# Patient Record
Sex: MALE | Race: OTHER
[De-identification: names, ages, dates, MRNs, and addresses within clinical notes are randomized per-mention and may not be internally consistent; named-entity substitution may affect disease eponyms.]

---

## 2019-02-15 ENCOUNTER — HOSPITAL ENCOUNTER (EMERGENCY)
Dept: HOSPITAL 61 - ER | Age: 34
LOS: 1 days | Discharge: HOME | End: 2019-02-16
Payer: COMMERCIAL

## 2019-02-15 VITALS — WEIGHT: 238 LBS | HEIGHT: 72 IN | BODY MASS INDEX: 32.23 KG/M2

## 2019-02-15 DIAGNOSIS — R42: ICD-10-CM

## 2019-02-15 DIAGNOSIS — R07.89: ICD-10-CM

## 2019-02-15 DIAGNOSIS — R03.0: Primary | ICD-10-CM

## 2019-02-15 LAB
BASOPHILS # BLD AUTO: 0 X10^3/UL (ref 0–0.2)
BASOPHILS NFR BLD: 1 % (ref 0–3)
EOSINOPHIL NFR BLD: 0.1 X10^3/UL (ref 0–0.7)
EOSINOPHIL NFR BLD: 1 % (ref 0–3)
ERYTHROCYTE [DISTWIDTH] IN BLOOD BY AUTOMATED COUNT: 12.4 % (ref 11.5–14.5)
HCT VFR BLD CALC: 43.6 % (ref 39–53)
HGB BLD-MCNC: 14.8 G/DL (ref 13–17.5)
LYMPHOCYTES # BLD: 3.7 X10^3/UL (ref 1–4.8)
LYMPHOCYTES NFR BLD AUTO: 45 % (ref 24–48)
MCH RBC QN AUTO: 31 PG (ref 25–35)
MCHC RBC AUTO-ENTMCNC: 34 G/DL (ref 31–37)
MCV RBC AUTO: 93 FL (ref 79–100)
MONO #: 0.6 X10^3/UL (ref 0–1.1)
MONOCYTES NFR BLD: 8 % (ref 0–9)
NEUT #: 3.8 X10^3UL (ref 1.8–7.7)
NEUTROPHILS NFR BLD AUTO: 46 % (ref 31–73)
PLATELET # BLD AUTO: 205 X10^3/UL (ref 140–400)
RBC # BLD AUTO: 4.71 X10^6/UL (ref 4.3–5.7)
WBC # BLD AUTO: 8.2 X10^3/UL (ref 4–11)

## 2019-02-15 PROCEDURE — 71045 X-RAY EXAM CHEST 1 VIEW: CPT

## 2019-02-15 PROCEDURE — 93005 ELECTROCARDIOGRAM TRACING: CPT

## 2019-02-15 PROCEDURE — 85025 COMPLETE CBC W/AUTO DIFF WBC: CPT

## 2019-02-15 PROCEDURE — 84484 ASSAY OF TROPONIN QUANT: CPT

## 2019-02-15 PROCEDURE — 80053 COMPREHEN METABOLIC PANEL: CPT

## 2019-02-15 PROCEDURE — 36415 COLL VENOUS BLD VENIPUNCTURE: CPT

## 2019-02-15 PROCEDURE — 83735 ASSAY OF MAGNESIUM: CPT

## 2019-02-15 PROCEDURE — 99284 EMERGENCY DEPT VISIT MOD MDM: CPT

## 2019-02-16 VITALS — DIASTOLIC BLOOD PRESSURE: 71 MMHG | SYSTOLIC BLOOD PRESSURE: 130 MMHG

## 2019-02-16 LAB
ALBUMIN SERPL-MCNC: 4 G/DL (ref 3.4–5)
ALBUMIN/GLOB SERPL: 1.1 {RATIO} (ref 1–1.7)
ALP SERPL-CCNC: 64 U/L (ref 46–116)
ALT SERPL-CCNC: 57 U/L (ref 16–63)
ANION GAP SERPL CALC-SCNC: 11 MMOL/L (ref 6–14)
AST SERPL-CCNC: 25 U/L (ref 15–37)
BILIRUB SERPL-MCNC: 0.4 MG/DL (ref 0.2–1)
BUN SERPL-MCNC: 19 MG/DL (ref 8–26)
BUN/CREAT SERPL: 21 (ref 6–20)
CALCIUM SERPL-MCNC: 9.4 MG/DL (ref 8.5–10.1)
CHLORIDE SERPL-SCNC: 103 MMOL/L (ref 98–107)
CO2 SERPL-SCNC: 26 MMOL/L (ref 21–32)
CREAT SERPL-MCNC: 0.9 MG/DL (ref 0.7–1.3)
GFR SERPLBLD BASED ON 1.73 SQ M-ARVRAT: 97.8 ML/MIN
GLOBULIN SER-MCNC: 3.8 G/DL (ref 2.2–3.8)
GLUCOSE SERPL-MCNC: 122 MG/DL (ref 70–99)
POTASSIUM SERPL-SCNC: 3.5 MMOL/L (ref 3.5–5.1)
PROT SERPL-MCNC: 7.8 G/DL (ref 6.4–8.2)
SODIUM SERPL-SCNC: 140 MMOL/L (ref 136–145)

## 2019-02-16 NOTE — PHYS DOC
Adult General


Chief Complaint


Chief Complaint:  DIZZY/LIGHT HEADED





HPI


HPI





Patient is a 33  year old male who presents with chest pain and lightheadedness


He has been on a diet for the last week he has cut out so that he is really not 

eating as much as he usually is used to he was sitting at the kitchen table 

tonight with his family he was looking at his cell phone he looked up quickly 

and he got a sort of a head rush she felt very lightheaded he felt some chest 

pressure that just lasted a couple of minutes it went away on its own he is 

currently feeling better but he wanted to get checked out no family history of 

coronary disease borderline hypertension but is trying to control his diet to 

help as noted above.





Review of Systems


Review of Systems


Negative for fever negative for shortness of breath otherwise negative except 

as noted above.





Allergies


Allergies





Allergies








Coded Allergies Type Severity Reaction Last Updated Verified


 


  No Known Drug Allergies    2/16/19 No











Physical Exam


Physical Exam





Constitutional: Well developed, well nourished, no acute distress, non-toxic 

appearance. []


HENT: Normocephalic, atraumatic, bilateral external ears normal, oropharynx 

moist, no oral exudates, nose normal. []


Eyes: PERRLA, EOMI, conjunctiva normal, no discharge. [] 


Neck: Normal range of motion, no tenderness, supple, no stridor. [] 


Cardiovascular:Heart rate regular rhythm, no murmur []


Lungs & Thorax:  Bilateral breath sounds clear to auscultation []


Abdomen: Bowel sounds normal, soft, no tenderness, no masses, no pulsatile 

masses. [] 


Extremities: No tenderness, no cyanosis, no clubbing, ROM intact, no edema. [] 


Neurologic: Alert and oriented X 3, normal motor function, normal sensory 

function, no focal deficits noted. []





Current Patient Data


Vital Signs





 Vital Signs








  Date Time  Temp Pulse Resp B/P (MAP) Pulse Ox O2 Delivery O2 Flow Rate FiO2


 


2/16/19 02:22  64 14  96   


 


2/15/19 23:50 97.8   133/87 (102)  Room Air  





 97.8       








Lab Values





 Laboratory Tests








Test


 2/15/19


23:33 2/16/19


01:38


 


White Blood Count


 8.2 x10^3/uL


(4.0-11.0) 





 


Red Blood Count


 4.71 x10^6/uL


(4.30-5.70) 





 


Hemoglobin


 14.8 g/dL


(13.0-17.5) 





 


Hematocrit


 43.6 %


(39.0-53.0) 





 


Mean Corpuscular Volume


 93 fL ()


 





 


Mean Corpuscular Hemoglobin 31 pg (25-35)   


 


Mean Corpuscular Hemoglobin


Concent 34 g/dL


(31-37) 





 


Red Cell Distribution Width


 12.4 %


(11.5-14.5) 





 


Platelet Count


 205 x10^3/uL


(140-400) 





 


Neutrophils (%) (Auto) 46 % (31-73)   


 


Lymphocytes (%) (Auto) 45 % (24-48)   


 


Monocytes (%) (Auto) 8 % (0-9)   


 


Eosinophils (%) (Auto) 1 % (0-3)   


 


Basophils (%) (Auto) 1 % (0-3)   


 


Neutrophils # (Auto)


 3.8 x10^3uL


(1.8-7.7) 





 


Lymphocytes # (Auto)


 3.7 x10^3/uL


(1.0-4.8) 





 


Monocytes # (Auto)


 0.6 x10^3/uL


(0.0-1.1) 





 


Eosinophils # (Auto)


 0.1 x10^3/uL


(0.0-0.7) 





 


Basophils # (Auto)


 0.0 x10^3/uL


(0.0-0.2) 





 


Sodium Level


 140 mmol/L


(136-145) 





 


Potassium Level


 3.5 mmol/L


(3.5-5.1) 





 


Chloride Level


 103 mmol/L


() 





 


Carbon Dioxide Level


 26 mmol/L


(21-32) 





 


Anion Gap 11 (6-14)   


 


Blood Urea Nitrogen


 19 mg/dL


(8-26) 





 


Creatinine


 0.9 mg/dL


(0.7-1.3) 





 


Estimated GFR


(Cockcroft-Gault) 97.8  


 





 


BUN/Creatinine Ratio 21 (6-20)  H 


 


Glucose Level


 122 mg/dL


(70-99)  H 





 


Calcium Level


 9.4 mg/dL


(8.5-10.1) 





 


Magnesium Level


 2.1 mg/dL


(1.8-2.4) 





 


Total Bilirubin


 0.4 mg/dL


(0.2-1.0) 





 


Aspartate Amino Transferase


(AST) 25 U/L (15-37)


 





 


Alanine Aminotransferase (ALT)


 57 U/L (16-63)


 





 


Alkaline Phosphatase


 64 U/L


() 





 


Troponin I Quantitative


 < 0.017 ng/mL


(0.000-0.055) < 0.017 ng/mL


(0.000-0.055)


 


Total Protein


 7.8 g/dL


(6.4-8.2) 





 


Albumin


 4.0 g/dL


(3.4-5.0) 





 


Albumin/Globulin Ratio 1.1 (1.0-1.7)   





 Laboratory Tests


2/15/19 23:33








 Laboratory Tests


2/15/19 23:33














EKG


EKG


[]EKG shows a normal sinus rhythm rate of 91 no acute ischemic changes noted 

interpreted by me time of encounter.





Radiology/Procedures


Radiology/Procedures


[]


Impressions:


Chest x-ray interpreted by me was negative acute.





Course & Med Decision Making


Course & Med Decision Making


Pertinent Labs and Imaging studies reviewed. (See chart for details)


lightheadedness probably related to rapid diet change


labs look good


trop neg x two


atypical type symptoms


heart score h 0 e 0 a 0 r 1 t 0


ok for d/c.


advised monitor bp closely as an outpatient.


[





Dragon Disclaimer


Dragon Disclaimer


This electronic medical record was generated, in whole or in part, using a 

voice recognition dictation system.





Departure


Departure


Impression:  


 Primary Impression:  


 Elevated blood pressure reading


 Additional Impression:  


 Lightheadedness


Disposition:  01 HOME, SELF-CARE


Condition:  STABLE


Referrals:  


RACHEL PHILLIP (PCP)


Patient Instructions:  Chest Pain (Nonspecific)





Problem Qualifiers











ORLANDO SILVER MD Feb 16, 2019 00:14

## 2019-02-16 NOTE — RAD
AP chest.

 

HISTORY: Chest pain

 

AP view was taken of the chest. Lungs are clear. Heart is normal in size 

without heart failure. There is no effusion.

 

IMPRESSION:

1. No acute chest disease.

 

Electronically signed by: Jaspreet Conklin MD (2/16/2019 7:01 AM) [id number]

## 2019-02-18 NOTE — EKG
[facility]

              [address] [place] [address]

Test Date:    2019-02-15               Test Time:    23:44:38

Pat Name:     GRACIELA LANDA            Department:   

Patient ID:   [id number]           Room:          

Gender:       M                        Technician:   

:          1985               Requested By: ORLANDO SILVER

Order Number: [id number]           Reading MD:     

                                 Measurements

Intervals                              Axis          

Rate:                                  P:            

MA:                                    QRS:          

QRSD:                                  T:            

QT:                                                  

QTc:                                                 

                           Interpretive Statements

## 2020-08-02 ENCOUNTER — HOSPITAL ENCOUNTER (EMERGENCY)
Dept: HOSPITAL 61 - ER | Age: 35
Discharge: HOME | End: 2020-08-02
Payer: COMMERCIAL

## 2020-08-02 VITALS — HEIGHT: 72 IN | BODY MASS INDEX: 33.86 KG/M2 | WEIGHT: 250 LBS

## 2020-08-02 VITALS — SYSTOLIC BLOOD PRESSURE: 145 MMHG | DIASTOLIC BLOOD PRESSURE: 84 MMHG

## 2020-08-02 DIAGNOSIS — I10: ICD-10-CM

## 2020-08-02 DIAGNOSIS — R00.0: ICD-10-CM

## 2020-08-02 DIAGNOSIS — R20.0: ICD-10-CM

## 2020-08-02 DIAGNOSIS — R42: Primary | ICD-10-CM

## 2020-08-02 LAB
ALBUMIN SERPL-MCNC: 4.1 G/DL (ref 3.4–5)
ALBUMIN/GLOB SERPL: 1.2 {RATIO} (ref 1–1.7)
ALP SERPL-CCNC: 60 U/L (ref 46–116)
ALT SERPL-CCNC: 55 U/L (ref 16–63)
AMPHETAMINE/METHAMPHETAMINE: (no result)
ANION GAP SERPL CALC-SCNC: 7 MMOL/L (ref 6–14)
APTT PPP: YELLOW S
AST SERPL-CCNC: 21 U/L (ref 15–37)
BACTERIA #/AREA URNS HPF: 0 /HPF
BARBITURATES UR-MCNC: (no result) UG/ML
BASOPHILS # BLD AUTO: 0.1 X10^3/UL (ref 0–0.2)
BASOPHILS NFR BLD: 1 % (ref 0–3)
BENZODIAZ UR-MCNC: (no result) UG/L
BILIRUB SERPL-MCNC: 0.3 MG/DL (ref 0.2–1)
BILIRUB UR QL STRIP: NEGATIVE
BUN SERPL-MCNC: 17 MG/DL (ref 8–26)
BUN/CREAT SERPL: 17 (ref 6–20)
CALCIUM SERPL-MCNC: 8.9 MG/DL (ref 8.5–10.1)
CANNABINOIDS UR-MCNC: (no result) UG/L
CHLORIDE SERPL-SCNC: 103 MMOL/L (ref 98–107)
CK SERPL-CCNC: 87 U/L (ref 39–308)
CO2 SERPL-SCNC: 28 MMOL/L (ref 21–32)
COCAINE UR-MCNC: (no result) NG/ML
CREAT SERPL-MCNC: 1 MG/DL (ref 0.7–1.3)
EOSINOPHIL NFR BLD: 0.1 X10^3/UL (ref 0–0.7)
EOSINOPHIL NFR BLD: 2 % (ref 0–3)
ERYTHROCYTE [DISTWIDTH] IN BLOOD BY AUTOMATED COUNT: 12.8 % (ref 11.5–14.5)
FIBRINOGEN PPP-MCNC: CLEAR MG/DL
GFR SERPLBLD BASED ON 1.73 SQ M-ARVRAT: 86.1 ML/MIN
GLOBULIN SER-MCNC: 3.5 G/DL (ref 2.2–3.8)
GLUCOSE SERPL-MCNC: 127 MG/DL (ref 70–99)
HCT VFR BLD CALC: 44 % (ref 39–53)
HGB BLD-MCNC: 15.1 G/DL (ref 13–17.5)
HYALINE CASTS #/AREA URNS LPF: (no result) /HPF
LYMPHOCYTES # BLD: 3.3 X10^3/UL (ref 1–4.8)
LYMPHOCYTES NFR BLD AUTO: 42 % (ref 24–48)
MAGNESIUM SERPL-MCNC: 1.8 MG/DL (ref 1.8–2.4)
MCH RBC QN AUTO: 31 PG (ref 25–35)
MCHC RBC AUTO-ENTMCNC: 34 G/DL (ref 31–37)
MCV RBC AUTO: 90 FL (ref 79–100)
METHADONE SERPL-MCNC: (no result) NG/ML
MONO #: 0.7 X10^3/UL (ref 0–1.1)
MONOCYTES NFR BLD: 9 % (ref 0–9)
NEUT #: 3.6 X10^3/UL (ref 1.8–7.7)
NEUTROPHILS NFR BLD AUTO: 46 % (ref 31–73)
NITRITE UR QL STRIP: NEGATIVE
OPIATES UR-MCNC: (no result) NG/ML
PCP SERPL-MCNC: (no result) MG/DL
PH UR STRIP: 5.5 [PH]
PLATELET # BLD AUTO: 226 X10^3/UL (ref 140–400)
POTASSIUM SERPL-SCNC: 3.4 MMOL/L (ref 3.5–5.1)
PROT SERPL-MCNC: 7.6 G/DL (ref 6.4–8.2)
PROT UR STRIP-MCNC: NEGATIVE MG/DL
RBC # BLD AUTO: 4.89 X10^6/UL (ref 4.3–5.7)
RBC #/AREA URNS HPF: (no result) /HPF (ref 0–2)
SODIUM SERPL-SCNC: 138 MMOL/L (ref 136–145)
SQUAMOUS #/AREA URNS LPF: (no result) /LPF
UROBILINOGEN UR-MCNC: 0.2 MG/DL
WBC # BLD AUTO: 7.7 X10^3/UL (ref 4–11)
WBC #/AREA URNS HPF: (no result) /HPF (ref 0–4)

## 2020-08-02 PROCEDURE — 81001 URINALYSIS AUTO W/SCOPE: CPT

## 2020-08-02 PROCEDURE — 83880 ASSAY OF NATRIURETIC PEPTIDE: CPT

## 2020-08-02 PROCEDURE — 83735 ASSAY OF MAGNESIUM: CPT

## 2020-08-02 PROCEDURE — 36415 COLL VENOUS BLD VENIPUNCTURE: CPT

## 2020-08-02 PROCEDURE — 80053 COMPREHEN METABOLIC PANEL: CPT

## 2020-08-02 PROCEDURE — 71045 X-RAY EXAM CHEST 1 VIEW: CPT

## 2020-08-02 PROCEDURE — 96360 HYDRATION IV INFUSION INIT: CPT

## 2020-08-02 PROCEDURE — 71275 CT ANGIOGRAPHY CHEST: CPT

## 2020-08-02 PROCEDURE — 85025 COMPLETE CBC W/AUTO DIFF WBC: CPT

## 2020-08-02 PROCEDURE — 82962 GLUCOSE BLOOD TEST: CPT

## 2020-08-02 PROCEDURE — 99285 EMERGENCY DEPT VISIT HI MDM: CPT

## 2020-08-02 PROCEDURE — 84484 ASSAY OF TROPONIN QUANT: CPT

## 2020-08-02 PROCEDURE — 82553 CREATINE MB FRACTION: CPT

## 2020-08-02 PROCEDURE — 93005 ELECTROCARDIOGRAM TRACING: CPT

## 2020-08-02 PROCEDURE — 80307 DRUG TEST PRSMV CHEM ANLYZR: CPT

## 2020-08-02 NOTE — RAD
AP portable chest 8/2/2020.

 

Reason for exam: Dizziness. Comparison is made with an exam of 2/15/2019.

 

No infiltrate or effusion is seen. Heart size and pulmonary vascularity 

appear normal.

 

IMPRESSION: No acute disease.

 

Electronically signed by: Dimitri Colby Jr., MD (8/2/2020 12:54 PM) 

[facility]

## 2020-08-02 NOTE — PHYS DOC
Past Medical History


Past Medical History:  Hypertension, Other


Additional Past Medical Histor:  PRE-HTN


Past Surgical History:  No Surgical History


Smoking Status:  Never Smoker


Alcohol Use:  Rarely


Drug Use:  None





General Adult


EDM:


Chief Complaint:  DIZZY/LIGHT HEADED





HPI:


HPI:





Patient is a 34  year old male with history of hypertension who presents to the 

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 

addict.  Patient reports feeling like he is spinning around.  He states the 

sensation was very short and subsided right away.  Denies any nausea, vomiting, 

headache.  Denies any fever, coughing or congestion.





Review of Systems:


Review of Systems:


Constitutional:   Denies fever or chills. []


Eyes:   Denies change in visual acuity. []


HENT:   Denies nasal congestion or sore throat. [] 


Respiratory:   Denies cough or shortness of breath. [] 


Cardiovascular:   Denies chest pain or edema. [] 


GI:   Denies abdominal pain, nausea, vomiting, bloody stools or diarrhea. [] 


:  Denies dysuria. [] 


Musculoskeletal:   Denies back pain or joint pain. [] 


Integument:   Denies rash. [] 


Neurologic: Reports dizziness.  Denies headache, focal weakness or sensory 

changes. [] 


Endocrine:   Denies polyuria or polydipsia. [] 


Lymphatic:  Denies swollen glands. [] 


Psychiatric:  Denies depression or anxiety. []





Heart Score:


Risk Factors:


Risk Factors:  DM, Current or recent (<one month) smoker, HTN, HLP, family histo

ry of CAD, obesity.


Risk Scores:


Score 0 - 3:  2.5% MACE over next 6 weeks - Discharge Home


Score 4 - 6:  20.3% MACE over next 6 weeks - Admit for Clinical Observation


Score 7 - 10:  72.7% MACE over next 6 weeks - Early Invasive Strategies





Current Medications:





Current Medications








 Medications


  (Trade)  Dose


 Ordered  Sig/Rigoberto  Start Time


 Stop Time Status Last Admin


Dose Admin


 


 Sodium Chloride  1,000 ml @ 


 1,000 mls/hr  1X  ONCE  20 12:15


 20 13:14  20 12:20


1,000 MLS/HR











Allergies:


Allergies:





Allergies








Coded Allergies Type Severity Reaction Last Updated Verified


 


  No Known Drug Allergies    19 No











Physical Exam:


PE:





Constitutional: Well developed, well nourished, no acute distress, non-toxic 

appearance. []


HENT: Normocephalic, atraumatic, bilateral external ears normal, oropharynx 

moist, no oral exudates, nose normal. []


Eyes: PERRLA, EOMI, conjunctiva normal, no discharge. [] 


Neck: Normal range of motion, no tenderness, supple, no stridor. [] 


Cardiovascular:Heart rate regular rhythm, no murmur []


Lungs & Thorax:  Bilateral breath sounds clear to auscultation []


Abdomen: Bowel sounds normal, soft, no tenderness, no masses, no pulsatile 

masses. [] 


Skin: Warm, dry, no erythema, no rash. [] 


Back: No tenderness, no CVA tenderness. [] 


Extremities: No tenderness, no cyanosis, no clubbing, ROM intact, no edema. [] 


Neurologic: Alert and oriented X 3, normal motor function, normal sensory 

function, no focal deficits noted.  Cranial nerves II through XII intact


Psychologic: Affect normal, judgement normal, mood normal. []





Current Patient Data:


Labs:





                                Laboratory Tests








Test


 20


12:07 20


12:12


 


Glucose (Fingerstick)


 122 mg/dL


(70-99)  H 





 


White Blood Count


 


 7.7 x10^3/uL


(4.0-11.0)


 


Red Blood Count


 


 4.89 x10^6/uL


(4.30-5.70)


 


Hemoglobin


 


 15.1 g/dL


(13.0-17.5)


 


Hematocrit


 


 44.0 %


(39.0-53.0)


 


Mean Corpuscular Volume


 


 90 fL ()





 


Mean Corpuscular Hemoglobin  31 pg (25-35)  


 


Mean Corpuscular Hemoglobin


Concent 


 34 g/dL


(31-37)


 


Red Cell Distribution Width


 


 12.8 %


(11.5-14.5)


 


Platelet Count


 


 226 x10^3/uL


(140-400)


 


Neutrophils (%) (Auto)  46 % (31-73)  


 


Lymphocytes (%) (Auto)  42 % (24-48)  


 


Monocytes (%) (Auto)  9 % (0-9)  


 


Eosinophils (%) (Auto)  2 % (0-3)  


 


Basophils (%) (Auto)  1 % (0-3)  


 


Neutrophils # (Auto)


 


 3.6 x10^3/uL


(1.8-7.7)


 


Lymphocytes # (Auto)


 


 3.3 x10^3/uL


(1.0-4.8)


 


Monocytes # (Auto)


 


 0.7 x10^3/uL


(0.0-1.1)


 


Eosinophils # (Auto)


 


 0.1 x10^3/uL


(0.0-0.7)


 


Basophils # (Auto)


 


 0.1 x10^3/uL


(0.0-0.2)


 


Sodium Level


 


 138 mmol/L


(136-145)


 


Potassium Level


 


 3.4 mmol/L


(3.5-5.1)  L


 


Chloride Level


 


 103 mmol/L


()


 


Carbon Dioxide Level


 


 28 mmol/L


(21-32)


 


Anion Gap  7 (6-14)  


 


Blood Urea Nitrogen


 


 17 mg/dL


(8-26)


 


Creatinine


 


 1.0 mg/dL


(0.7-1.3)


 


Estimated GFR


(Cockcroft-Gault) 


 86.1  





 


BUN/Creatinine Ratio  17 (6-20)  


 


Glucose Level


 


 127 mg/dL


(70-99)  H


 


Calcium Level


 


 8.9 mg/dL


(8.5-10.1)


 


Magnesium Level  Pending  


 


Total Bilirubin  Pending  


 


Aspartate Amino Transferase


(AST) 


 Pending  





 


Alanine Aminotransferase (ALT)  Pending  


 


Alkaline Phosphatase  Pending  


 


Total Protein  Pending  


 


Albumin  Pending  


 


Albumin/Globulin Ratio  Pending  





                                Laboratory Tests


20 12:12








                                Laboratory Tests


20 12:12








Vital Signs:





                                   Vital Signs








  Date Time  Temp Pulse Resp B/P (MAP) Pulse Ox O2 Delivery O2 Flow Rate FiO2


 


20 11:56 98.6 128 20 167/95 (119) 99 Room Air  





 98.6       











EKG:


EK Interpreted by Dr. Galdamez sinus tachycardia heart rate 149 no STEMI []





Radiology/Procedures:


Radiology/Procedures:


[]PROCEDURE: CT ANGIOGRAPHY CHEST





CT ANGIOGRAPHY CHEST 


 


INDICATION:  tachycardia. chest tightness 


 


Comparison: Radiograph 2020.


 


TECHNIQUE: Following the uneventful administration of intravenous 


contrast, 100 cc Omnipaque 350, axial CT sections were obtained through 


the lungs and upper abdomen. Multiplanar reconstructions and MIP images 


were obtained.


 


PQRS compliance statement:


 


One or more of the following individualized dose reduction techniques were


utilized for this examination:


1. Automated exposure control


2. Adjustment of the mA and/or kV according to patient size


3. Use of iterative reconstruction technique


 


FINDINGS: 


 


Pulmonary arteries: No evidence of pulmonary thromboembolic disease


 


Lungs and Airways: No pulmonary mass or consolidation. No abnormality of 


the central airways. 


 


Pleura: The pleural spaces are normal.


 


Heart and Mediastinum: The visualized thyroid is normal in size and 


attenuation. No axillary or supraclavicular lymphadenopathy. No 


mediastinal, hilar or retrocrural lymphadenopathy. The heart and 


pericardium are within normal limits. The great vessels of the thorax are 


normal. 


 


Abdomen: Hepatic steatosis.


 


Bones and Soft Tissues: The visualized bones and chest wall soft tissues 


are within normal limits.


 


IMPRESSION:  


1. No evidence of pulmonary thromboembolic disease.


2. No pulmonary mass or consolidation.


 


Electronically signed by: Jeanine Bishop MD (2020 3:49 PM) [id number]














DICTATED and SIGNED BY:     JEANINE BISHOP MD


DATE:     20 1549





Course & Med Decision Making:


Course & Med Decision Making


Pertinent Labs and Imaging studies reviewed. (See chart for details)





This is a 34-year-old male patient presenting to the ED today complaining of an 

episode of dizziness that began while driving after work.  He had worked in an 

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





Patient arrives in the ED with heart rates in the 140s, blood pressure in the 

150s over 90s.  Has history of hypertension.





CBC, CMP, no acute findings, urine drug screen noted for marijuana use.  

Troponin is normal.  CTA chest is negative.





Patient was discharged to home.  Instructed to follow-up with the PCP which he 

stated he has for high blood pressure management as well as medical care.  

Provided return precautions and discharged in stable condition.





Dragon Disclaimer:


Dragon Disclaimer:


This electronic medical record was generated, in whole or in part, using a voice

 recognition dictation system.





Departure


Departure


Impression:  


   Primary Impression:  


   Dizziness


   Additional Impressions:  


   Tachycardia


   Hypertension


   Qualified Codes:  I10 - Essential (primary) hypertension


Disposition:   HOME, SELF-CARE


Condition:  STABLE


Referrals:  


RACHEL PHILLIP (PCP)


follow up next week


Patient Instructions:  Nonspecific Tachycardia





Additional Instructions:  


You were evaluated in the emergency room for dizziness.  Your work-up in the 

emergency room was negative for any acute findings.  Your blood pressure was 

noted to be high.  Please follow-up with your own primary care doctor and have 

them manage your blood pressure.  Consider diet and exercise.  Come back to the 

ED at any point symptoms worsen


Scripts


Meclizine Hcl (MECLIZINE HCL) 12.5 Mg Tablet


1 TAB PO TID, #30 TAB


   Prov: AYSE DELONG         20





Justicifation of Admission Dx:


Justifications for Admission:


Justification of Admission Dx:  N/A











AYSE DELONG               Aug 2, 2020 12:45

## 2020-08-02 NOTE — RAD
CT ANGIOGRAPHY CHEST 

 

INDICATION:  tachycardia. chest tightness 

 

Comparison: Radiograph 2/20/2020.

 

TECHNIQUE: Following the uneventful administration of intravenous 

contrast, 100 cc Omnipaque 350, axial CT sections were obtained through 

the lungs and upper abdomen. Multiplanar reconstructions and MIP images 

were obtained.

 

PQRS compliance statement:

 

One or more of the following individualized dose reduction techniques were

utilized for this examination:

1. Automated exposure control

2. Adjustment of the mA and/or kV according to patient size

3. Use of iterative reconstruction technique

 

FINDINGS: 

 

Pulmonary arteries: No evidence of pulmonary thromboembolic disease

 

Lungs and Airways: No pulmonary mass or consolidation. No abnormality of 

the central airways. 

 

Pleura: The pleural spaces are normal.

 

Heart and Mediastinum: The visualized thyroid is normal in size and 

attenuation. No axillary or supraclavicular lymphadenopathy. No 

mediastinal, hilar or retrocrural lymphadenopathy. The heart and 

pericardium are within normal limits. The great vessels of the thorax are 

normal. 

 

Abdomen: Hepatic steatosis.

 

Bones and Soft Tissues: The visualized bones and chest wall soft tissues 

are within normal limits.

 

IMPRESSION:  

1. No evidence of pulmonary thromboembolic disease.

2. No pulmonary mass or consolidation.

 

Electronically signed by: Rodolfo Bishop MD (8/2/2020 3:49 PM) [id number]

## 2020-08-03 NOTE — EKG
[facility]

              [address] [place] [address]

Test Date:    2020               Test Time:    12:05:38

Pat Name:     GRACIELA LANDA            Department:   

Patient ID:   [id number]           Room:          

Gender:                               Technician:   

:          1985               Requested By: AYSE DELONG

Order Number: [id number]           Reading MD:     

                                 Measurements

Intervals                              Axis          

Rate:         125                      P:            64

[place]:           146                      QRS:          43

QRSD:         88                       T:            29

QT:           304                                    

QTc:          441                                    

                           Interpretive Statements

SINUS TACHYCARDIA

QRS(T) CONTOUR ABNORMALITY

CONSIDER ANTEROLATERAL MYOCARDIAL DAMAGE

POSSIBLY ABNORMAL ECG

RI6.01

No previous ECG available for comparison

## 2020-08-03 NOTE — EKG
[facility]

              [address] [place] [address]

Test Date:    2020               Test Time:    12:09:33

Pat Name:     GRACIELA LANDA            Department:   

Patient ID:   [id number]           Room:          

Gender:                               Technician:   

:          1985               Requested By: AYSE DELONG

Order Number: [id number]           Reading MD:     

                                 Measurements

Intervals                              Axis          

Rate:         149                      P:            -120

[place]:           108                      QRS:          66

QRSD:         92                       T:            31

QT:           266                                    

QTc:          422                                    

                           Interpretive Statements

SINUS TACHYCARDIA

LEFT ATRIAL ABNORMALITY

QRS(T) CONTOUR ABNORMALITY

CONSIDER ANTEROLATERAL MYOCARDIAL DAMAGE

ABNORMAL ECG

RI6.01

No previous ECG available for comparison

## 2020-11-28 ENCOUNTER — HOSPITAL ENCOUNTER (EMERGENCY)
Dept: HOSPITAL 61 - ER | Age: 35
LOS: 1 days | Discharge: HOME | End: 2020-11-29
Payer: SELF-PAY

## 2020-11-28 VITALS — BODY MASS INDEX: 32.65 KG/M2 | WEIGHT: 220.46 LBS | HEIGHT: 69 IN

## 2020-11-28 DIAGNOSIS — I10: ICD-10-CM

## 2020-11-28 DIAGNOSIS — F41.9: Primary | ICD-10-CM

## 2020-11-28 PROCEDURE — 99281 EMR DPT VST MAYX REQ PHY/QHP: CPT

## 2020-11-29 VITALS — DIASTOLIC BLOOD PRESSURE: 93 MMHG | SYSTOLIC BLOOD PRESSURE: 148 MMHG

## 2020-11-29 NOTE — PHYS DOC
Past Medical History


Past Medical History:  Hypertension, Other


Additional Past Medical Histor:  PRE-HTN


Past Surgical History:  No Surgical History


Smoking Status:  Never Smoker


Alcohol Use:  Rarely


Drug Use:  None





General Adult


EDM:


Chief Complaint:  DIZZY/LIGHT HEADED





HPI:


HPI:





Patient is a 35  year old male who presents for an episode of dizziness/light 

headedness. He describes feeling disoriented during the episode. He took his BP 

at the time which was noted to be 201/108. He took a second dose of his 

amlodipine 5mg as instructed to do so by his PCP during an episode. Symptoms 

resolved within a few minutes. BP upon arrival to the ED was 143/104. He is not 

currently have symptoms. He notes feeling nervous about being in the hospital.





Review of Systems:


Review of Systems:





Constitutional: Denies fever or chills 


Eyes: Denies eye pain or change in vision 


HENT: Denies nasal congestion or sore throat


Respiratory: Denies cough or shortness of breath 


Cardiovascular: Denies chest pain or palpitations


GI: Denies abdominal pain, nausea, or vomiting


Musculoskeletal: Denies back pain or joint pain


Integument: Denies rash or skin lesions 


Neurologic: Denies headache, focal weakness or sensory changes





Complete systems were reviewed and found to be within normal limits, except as 

documented in this note.





Allergies:


Allergies:





Allergies








Coded Allergies Type Severity Reaction Last Updated Verified


 


  No Known Drug Allergies    2/16/19 No











Physical Exam:


PE:





Constitutional: Well developed, well nourished, no acute distress, non-toxic 

appearance, anxious


HENT: Normocephalic, atraumatic


Eyes: PERRL, EOMI, conjunctiva normal, no discharge


Neck: Normal range of motion, no tenderness, supple, no meningeal signs


Lungs & Thorax:  No respiratory distress, equal chest rise and fall, no 

tenderness on palpation of chest


Abdomen: Soft, no tenderness


Skin: Warm, dry, no erythema, no rash


Back: No tenderness, no CVA tenderness


Extremities: No tenderness, ROM intact, no edema


Neurologic: Alert and oriented X 3, cranial nerves 2-12 intact bilaterally, 

UE/LE reflexes +2/4, UE/LE strength/PROM/AROM equal bilaterally, no focal 

deficits noted


Psychologic: Affect normal, judgment normal





Course & Med Decision Making:


Course & Med Decision Making





Patient is a 35 year old male who presents following an episode of 

dizziness/light headedness with the feeling of disorientation. All symptoms have

resolved. He was recently diagnosed with HTN and started on medication that is 

still being titrated to find an optimal dose.





Recommended daily BP measurements to be taken at the same time, with the same 

cuff, and under similar circumstances. 





Patient stable for discharge with outpatient follow-up with PCP. Discussed 

findings and plan with patient, who acknowledges understanding and agreement.





Dragon Disclaimer:


Dragon Disclaimer:


This electronic medical record was generated, in whole or in part, using a voice

recognition dictation system.





Departure


Departure


Impression:  


   Primary Impression:  


   Anxiety


   Additional Impression:  


   Hypertension


   Qualified Codes:  I10 - Essential (primary) hypertension


Disposition:  01 DC HOME SELF CARE/HOMELESS


Condition:  STABLE


Referrals:  


RAINE COTTRELL MD (PCP)


Patient Instructions:  Anxiety and Panic Attacks, Easy-to-Read, Hypertension, 

Easy-to-Read





Additional Instructions:  


Follow closely with your doctor for further evaluation of your hypertension.











MICHAEL OLIVARES DO             Nov 29, 2020 00:10